# Patient Record
Sex: FEMALE | Race: BLACK OR AFRICAN AMERICAN | Employment: UNEMPLOYED | URBAN - METROPOLITAN AREA
[De-identification: names, ages, dates, MRNs, and addresses within clinical notes are randomized per-mention and may not be internally consistent; named-entity substitution may affect disease eponyms.]

---

## 2023-05-27 ENCOUNTER — HOSPITAL ENCOUNTER (INPATIENT)
Facility: HOSPITAL | Age: 28
LOS: 6 days | Discharge: HOME | End: 2023-06-03
Attending: EMERGENCY MEDICINE | Admitting: PSYCHIATRY & NEUROLOGY
Payer: MEDICAID

## 2023-05-27 DIAGNOSIS — F23 ACUTE PSYCHOSIS (HCC): Primary | ICD-10-CM

## 2023-05-27 PROCEDURE — 80307 DRUG TEST PRSMV CHEM ANLYZR: CPT

## 2023-05-27 PROCEDURE — 81001 URINALYSIS AUTO W/SCOPE: CPT

## 2023-05-27 PROCEDURE — 85025 COMPLETE CBC W/AUTO DIFF WBC: CPT

## 2023-05-27 PROCEDURE — 87636 SARSCOV2 & INF A&B AMP PRB: CPT

## 2023-05-27 PROCEDURE — 80048 BASIC METABOLIC PNL TOTAL CA: CPT

## 2023-05-27 PROCEDURE — 99285 EMERGENCY DEPT VISIT HI MDM: CPT

## 2023-05-27 PROCEDURE — 82077 ASSAY SPEC XCP UR&BREATH IA: CPT

## 2023-05-27 PROCEDURE — 84703 CHORIONIC GONADOTROPIN ASSAY: CPT

## 2023-05-27 PROCEDURE — 36415 COLL VENOUS BLD VENIPUNCTURE: CPT

## 2023-05-28 PROBLEM — F33.9 MAJOR DEPRESSION, RECURRENT (HCC): Status: ACTIVE | Noted: 2023-05-28

## 2023-05-28 PROBLEM — F33.9 MAJOR DEPRESSIVE DISORDER, RECURRENT (HCC): Status: ACTIVE | Noted: 2023-05-28

## 2023-05-28 LAB
AMPHET UR QL SCN: NEGATIVE
ANION GAP SERPL CALC-SCNC: 8 MMOL/L (ref 5–15)
APPEARANCE UR: CLEAR
BACTERIA URNS QL MICRO: NEGATIVE /HPF
BARBITURATES UR QL SCN: NEGATIVE
BASOPHILS # BLD: 0.1 K/UL (ref 0–0.1)
BASOPHILS NFR BLD: 1 % (ref 0–1)
BENZODIAZ UR QL: NEGATIVE
BILIRUB UR QL: NEGATIVE
BUN SERPL-MCNC: 13 MG/DL (ref 6–20)
BUN/CREAT SERPL: 15 (ref 12–20)
CA-I BLD-MCNC: 9.3 MG/DL (ref 8.5–10.1)
CANNABINOIDS UR QL SCN: NEGATIVE
CHLORIDE SERPL-SCNC: 104 MMOL/L (ref 97–108)
CO2 SERPL-SCNC: 20 MMOL/L (ref 21–32)
COCAINE UR QL SCN: NEGATIVE
COLOR UR: ABNORMAL
CREAT SERPL-MCNC: 0.87 MG/DL (ref 0.55–1.02)
DIFFERENTIAL METHOD BLD: ABNORMAL
EOSINOPHIL # BLD: 0 K/UL (ref 0–0.4)
EOSINOPHIL NFR BLD: 0 % (ref 0–7)
EPITH CASTS URNS QL MICRO: ABNORMAL /LPF
ERYTHROCYTE [DISTWIDTH] IN BLOOD BY AUTOMATED COUNT: 13.1 % (ref 11.5–14.5)
ETHANOL SERPL-MCNC: 16 MG/DL (ref 0–0.08)
FLUAV RNA SPEC QL NAA+PROBE: NOT DETECTED
FLUBV RNA SPEC QL NAA+PROBE: NOT DETECTED
GLUCOSE SERPL-MCNC: 114 MG/DL (ref 65–100)
GLUCOSE UR STRIP.AUTO-MCNC: NEGATIVE MG/DL
HCG SERPL QL: NEGATIVE
HCT VFR BLD AUTO: 34.2 % (ref 35–47)
HGB BLD-MCNC: 11.2 G/DL (ref 11.5–16)
HGB UR QL STRIP: NEGATIVE
HYALINE CASTS URNS QL MICRO: ABNORMAL /LPF (ref 0–5)
IMM GRANULOCYTES # BLD AUTO: 0 K/UL (ref 0–0.04)
IMM GRANULOCYTES NFR BLD AUTO: 0 % (ref 0–0.5)
KETONES UR QL STRIP.AUTO: 20 MG/DL
LEUKOCYTE ESTERASE UR QL STRIP.AUTO: NEGATIVE
LYMPHOCYTES # BLD: 1.8 K/UL (ref 0.8–3.5)
LYMPHOCYTES NFR BLD: 29 % (ref 12–49)
Lab: ABNORMAL
MCH RBC QN AUTO: 26.8 PG (ref 26–34)
MCHC RBC AUTO-ENTMCNC: 32.7 G/DL (ref 30–36.5)
MCV RBC AUTO: 81.8 FL (ref 80–99)
METHADONE UR QL: NEGATIVE
MONOCYTES # BLD: 0.4 K/UL (ref 0–1)
MONOCYTES NFR BLD: 7 % (ref 5–13)
MUCOUS THREADS URNS QL MICRO: ABNORMAL /LPF
NEUTS SEG # BLD: 4 K/UL (ref 1.8–8)
NEUTS SEG NFR BLD: 63 % (ref 32–75)
NITRITE UR QL STRIP.AUTO: NEGATIVE
NRBC # BLD: 0 K/UL (ref 0–0.01)
NRBC BLD-RTO: 0 PER 100 WBC
OPIATES UR QL: POSITIVE
PCP UR QL: NEGATIVE
PH UR STRIP: 5 (ref 5–8)
PLATELET # BLD AUTO: 368 K/UL (ref 150–400)
PMV BLD AUTO: 10.3 FL (ref 8.9–12.9)
POTASSIUM SERPL-SCNC: 3.7 MMOL/L (ref 3.5–5.1)
PROT UR STRIP-MCNC: NEGATIVE MG/DL
RBC # BLD AUTO: 4.18 M/UL (ref 3.8–5.2)
RBC #/AREA URNS HPF: ABNORMAL /HPF (ref 0–5)
SARS-COV-2 RNA RESP QL NAA+PROBE: NOT DETECTED
SODIUM SERPL-SCNC: 132 MMOL/L (ref 136–145)
SP GR UR REFRACTOMETRY: 1.01 (ref 1–1.03)
URINE CULTURE IF INDICATED: ABNORMAL
UROBILINOGEN UR QL STRIP.AUTO: 0.1 EU/DL (ref 0.1–1)
WBC # BLD AUTO: 6.3 K/UL (ref 3.6–11)
WBC URNS QL MICRO: ABNORMAL /HPF (ref 0–4)

## 2023-05-28 PROCEDURE — 6360000002 HC RX W HCPCS

## 2023-05-28 PROCEDURE — 6370000000 HC RX 637 (ALT 250 FOR IP): Performed by: PSYCHIATRY & NEUROLOGY

## 2023-05-28 PROCEDURE — 1240000000 HC EMOTIONAL WELLNESS R&B

## 2023-05-28 PROCEDURE — 6360000002 HC RX W HCPCS: Performed by: PSYCHIATRY & NEUROLOGY

## 2023-05-28 PROCEDURE — 2580000003 HC RX 258: Performed by: EMERGENCY MEDICINE

## 2023-05-28 PROCEDURE — 6360000002 HC RX W HCPCS: Performed by: EMERGENCY MEDICINE

## 2023-05-28 RX ORDER — MAGNESIUM HYDROXIDE/ALUMINUM HYDROXICE/SIMETHICONE 120; 1200; 1200 MG/30ML; MG/30ML; MG/30ML
30 SUSPENSION ORAL EVERY 6 HOURS PRN
Status: DISCONTINUED | OUTPATIENT
Start: 2023-05-28 | End: 2023-06-03 | Stop reason: HOSPADM

## 2023-05-28 RX ORDER — TRAZODONE HYDROCHLORIDE 50 MG/1
50 TABLET ORAL NIGHTLY PRN
Status: DISCONTINUED | OUTPATIENT
Start: 2023-05-28 | End: 2023-06-03 | Stop reason: HOSPADM

## 2023-05-28 RX ORDER — ACETAMINOPHEN 325 MG/1
650 TABLET ORAL EVERY 4 HOURS PRN
Status: DISCONTINUED | OUTPATIENT
Start: 2023-05-28 | End: 2023-06-03 | Stop reason: HOSPADM

## 2023-05-28 RX ORDER — ZIPRASIDONE MESYLATE 20 MG/ML
INJECTION, POWDER, LYOPHILIZED, FOR SOLUTION INTRAMUSCULAR
Status: COMPLETED
Start: 2023-05-28 | End: 2023-05-28

## 2023-05-28 RX ORDER — ZIPRASIDONE MESYLATE 20 MG/ML
20 INJECTION, POWDER, LYOPHILIZED, FOR SOLUTION INTRAMUSCULAR ONCE
Status: DISCONTINUED | OUTPATIENT
Start: 2023-05-28 | End: 2023-06-03 | Stop reason: HOSPADM

## 2023-05-28 RX ORDER — LORAZEPAM 2 MG/ML
1 INJECTION INTRAMUSCULAR EVERY 4 HOURS PRN
Status: DISCONTINUED | OUTPATIENT
Start: 2023-05-28 | End: 2023-06-03 | Stop reason: HOSPADM

## 2023-05-28 RX ORDER — ZIPRASIDONE MESYLATE 20 MG/ML
20 INJECTION, POWDER, LYOPHILIZED, FOR SOLUTION INTRAMUSCULAR EVERY 12 HOURS PRN
Status: DISCONTINUED | OUTPATIENT
Start: 2023-05-28 | End: 2023-06-03 | Stop reason: HOSPADM

## 2023-05-28 RX ORDER — ZIPRASIDONE HYDROCHLORIDE 20 MG/1
20 CAPSULE ORAL EVERY 12 HOURS PRN
Status: DISCONTINUED | OUTPATIENT
Start: 2023-05-28 | End: 2023-06-03 | Stop reason: HOSPADM

## 2023-05-28 RX ORDER — LORAZEPAM 1 MG/1
1 TABLET ORAL EVERY 4 HOURS PRN
Status: DISCONTINUED | OUTPATIENT
Start: 2023-05-28 | End: 2023-06-03 | Stop reason: HOSPADM

## 2023-05-28 RX ORDER — RISPERIDONE 1 MG/1
1 TABLET ORAL 2 TIMES DAILY
Status: DISCONTINUED | OUTPATIENT
Start: 2023-05-28 | End: 2023-05-29

## 2023-05-28 RX ADMIN — TRAZODONE HYDROCHLORIDE 50 MG: 50 TABLET ORAL at 21:33

## 2023-05-28 RX ADMIN — RISPERIDONE 1 MG: 1 TABLET ORAL at 21:33

## 2023-05-28 RX ADMIN — LORAZEPAM 1 MG: 2 INJECTION INTRAMUSCULAR; INTRAVENOUS at 12:20

## 2023-05-28 RX ADMIN — ZIPRASIDONE MESYLATE 20 MG: 20 INJECTION, POWDER, LYOPHILIZED, FOR SOLUTION INTRAMUSCULAR at 12:22

## 2023-05-28 RX ADMIN — ZIPRASIDONE MESYLATE 20 MG: 20 INJECTION, POWDER, LYOPHILIZED, FOR SOLUTION INTRAMUSCULAR at 03:05

## 2023-05-29 PROCEDURE — 6370000000 HC RX 637 (ALT 250 FOR IP): Performed by: PSYCHIATRY & NEUROLOGY

## 2023-05-29 PROCEDURE — 1240000000 HC EMOTIONAL WELLNESS R&B

## 2023-05-29 RX ORDER — RISPERIDONE 2 MG/1
2 TABLET ORAL 2 TIMES DAILY
Status: DISCONTINUED | OUTPATIENT
Start: 2023-05-29 | End: 2023-06-03 | Stop reason: HOSPADM

## 2023-05-29 RX ADMIN — TRAZODONE HYDROCHLORIDE 50 MG: 50 TABLET ORAL at 20:56

## 2023-05-29 RX ADMIN — RISPERIDONE 1 MG: 1 TABLET ORAL at 08:25

## 2023-05-29 RX ADMIN — RISPERIDONE 2 MG: 2 TABLET ORAL at 20:46

## 2023-05-29 NOTE — GROUP NOTE
Group Therapy Note    Date: 5/29/2023    Group Start Time: 1525  Group End Time: 9894  Group Topic: Recreational    SSR 2 BH NON ACUTE    Mahesh Blount        Group Therapy Note    Facilitated leisure skills group to reinforce positive coping and to manage mood through music, social interaction, group activities and art task    Attendees: 7/10      Notes:  Encouraged but did not attend    Discipline Responsible: Recreational Therapist      Signature:  Mahesh Blount, [address]

## 2023-05-29 NOTE — BH NOTE
PSA PART II ADDITIONAL INFORMATION        Access To Fire Arms: No    Substance Use: No    Last Use: Pt denied    Type of Substance: no substance use    Frequency of Use: n/a    Request to See : No    If yes, notified : No    Guardian:No    Guardian Contact:Pt is her own legal guardian    Release of Information Signed: Yes    Release of Information Signed For: Ervin Macias (sister) [phone],ADRYAN (boy friend) [phone]

## 2023-05-29 NOTE — CONSULTS
Consult Hospitalist History and Physical    Patient: Gina Warren MRN: [id number]  SSN: [id number]    YOB: 1995  Age: 25 y.o. Sex: female      Subjective:      Gina Warren is a 25 y.o. female with no past medical history that presented to the emergency room on 2023 under E CO for suicidal ideation and acute psychosis. Patient medically cleared from the emergency room and transferred to behavioral health under Dr. Cb patel. Team was consulted on for full history and physical.  Patient overall poor historian and slow to respond however does answer questions appropriately. She denies any past medical history or taking any medications on a routine basis. She does admit to 2  sections. Denies any allergies to any medications. Currently she denies any chest pain, shortness breath, abdominal pain, nausea, vomiting, diarrhea    Past Medical History:   Diagnosis Date    Bipolar 1 disorder (Shasta Jeffrey)      No past surgical history on file.  -2  sections  No family history on file.  -Hypertension  Social History     Tobacco Use    Smoking status: Not on file    Smokeless tobacco: Not on file   Substance Use Topics    Alcohol use: Not on file      Prior to Admission medications    Not on File        Not on File    Review of Systems:  Constitutional: No fevers, No chills, No fatigue, No weakness  Eyes: No visual disturbance  Ears, Nose, Mouth, Throat, and Face: No nasal congestion, No sore throat  Respiratory: No cough, No sputum, No wheezing, No SOB  Cardiovascular: No chest pain, No lower extremity edema, No Palpitations   Gastrointestinal: No nausea, No vomiting, No diarrhea, No constipation, No abdominal pain  Genitourinary: No frequency, No dysuria, No hematuria  Integument/Breast: No rash, No skin lesion(s), No dryness  Musculoskeletal: No arthralgias, No neck pain, No back pain  Neurological: No headaches, No dizziness, No confusion,  No seizures  Behavioral/Psychiatric: No

## 2023-05-29 NOTE — GROUP NOTE
Group Therapy Note    Date: 5/29/2023    Group Start Time: 8739  Group End Time: 1200  Group Topic: Education Group - Inpatient    [facility] [address] [facility] NON ACUTE    Jono Shell        Group Therapy Note    Facilitated group to define different types of cognitive distortions and give examples to help recognize how they have engaged in some of them and how they affect moods and behaviors    Attendees: 4/10       Notes:  Encouraged but did not attend     Discipline Responsible: Recreational Therapist      Signature:  Jono Shell., CTRS

## 2023-05-29 NOTE — BH NOTE
PSYCHOSOCIAL ASSESSMENT  :Patient identifying info:   Devyn Hanley is a 25 y.o., female admitted 5/27/2023 10:28 PM     Presenting problem and precipitating factors: Pt was brought the to the ED after her sister placed her under an ECO after stating that she wanted to kill herself. Pt presented with flight of thought that had loose associations. Pts sister shared that pt started acting \"bizarre\" and \"saying things out of her head\". Pts sister shared that she stated that she was \"HIV positive\" and does not know if that is a fact. She shared that pt had a \"mental breakdown in March and was in the hospital for 3 weeks\". She is not sure if the pt is taking her medication prescribed because she found a full bottle in her belongings. There was also a dx of unspecified bi-polar from her last hospitalization. Mental status assessment: Pt presented with a tired, disconnected affect and congruent mood. Pt denied any SI/HI/Avh at the time and was orientated x2. Pt provided minimal eye contact and verbal communication with the writer. Pt shared that she was trying to \"get my head right\". Pt had a difficult time sharing why, or how she was in the hospital. Pt appeared to have slight impairment in her memory and cognition.      Strengths/Recreation/Coping Skills:Pt denied to respond    Collateral information: Lizbet Patel (sister) [phone] Ebenezer Mahmood (boy friend) [phone]    Current psychiatric /substance abuse providers and contact info: Pt has a therapist and psychiatrist in [place]    Previous psychiatric/substance abuse providers and response to treatment: Pt was hospitalized multiple times for mental health with the last one being in March for 3 weeks    Family history of mental illness or substance abuse: Pts mother and brother are dx with mental health (per sister) anxiety and depression    Substance abuse history:    Social History     Tobacco Use    Smoking status: Not on file    Smokeless tobacco: Not on file

## 2023-05-29 NOTE — PLAN OF CARE
Problem: Confusion  Goal: Confusion, delirium, dementia, or psychosis is improved or at baseline  Description: INTERVENTIONS:  1. Assess for possible contributors to thought disturbance, including medications, impaired vision or hearing, underlying metabolic abnormalities, dehydration, psychiatric diagnoses, and notify attending LIP  2. [place] high risk fall precautions, as indicated  3. Provide frequent short contacts to provide reality reorientation, refocusing and direction  4. Decrease environmental stimuli, including noise as appropriate  5. Monitor and intervene to maintain adequate nutrition, hydration, elimination, sleep and activity  6. If unable to ensure safety without constant attention obtain sitter and review sitter guidelines with assigned personnel  7.  Initiate Psychosocial CNS and Spiritual Care consult, as indicated  Outcome: Not Progressing  Flowsheets  Taken 5/29/2023 0425  Effect of thought disturbance (confusion, delirium, dementia, or psychosis) are managed with adequate functional status:   Assess for contributors to thought disturbance, including medications, impaired vision or hearing, underlying metabolic abnormalities, dehydration, psychiatric diagnoses, notify LIP   Decrease environmental stimuli, including noise as appropriate   Monitor and intervene to maintain adequate nutrition, hydration, elimination, sleep and activity   If unable to ensure safety without constant attention obtain sitter and review sitter guidelines with assigned personnel  Taken 5/28/2023 2130  Effect of thought disturbance (confusion, delirium, dementia, or psychosis) are managed with adequate functional status:   Assess for contributors to thought disturbance, including medications, impaired vision or hearing, underlying metabolic abnormalities, dehydration, psychiatric diagnoses, notify LIP   Decrease environmental stimuli, including noise as appropriate   Monitor and intervene to maintain adequate

## 2023-05-29 NOTE — BH NOTE
1:1 Notes:     0829PP- Patient laying in the bed resting with 1:1 staff in room. 26am- Patient laying in the bed awake. Patient has a flat affect and a delayed response. Patient stated that she was \"just here\" in response to how she was doing today. Patient medication compliant but did ask what the medication was for. Denies SI/HI. Patient states \"sometimes\" to hearing things and or seeing things. 1:1 staff at bedside and reported that she ate a little of her breakfast.     1030am- Patient in bed awake. Patient continues to state that Ailyn Weeks is confused and everything is like a puzzle to her right now. \" Patient presents with a flat affect and constricted and delayed response. Patient asked about her glasses and asked if she came in with glasses on. Patient noted to stand up and eat a couple bites of her breakfast and went back to her bed to sit on her bed. This writer sitting at bedside. 1249pm- Patient got up for lunch and went back to her room with her tray but did not eat her lunch. Patient sitting in the dayroom for a little while and went back to her room. Patient presents with a flat affect. 1:1 staff present with patient. 1505pm- Patient laying in the bed with 1:1 staff at bedside. 1650pm- Patient sitting in her bed eating her dinner. Patient shook her head yes to doing okay. 1:1 staff at bedside. 1810pm- Patient laying in the bed with her eyes closed and quiet. 1:1 staff at bedside.

## 2023-05-29 NOTE — BH NOTE
Patient observed up on unit in day room watching television. No interaction with peers. Remains on 1:1 observation with staff at her side. She requested her sister's phone number. She was unsure what brought her to our ED and the events that have transpired since arriving to the floor. She was familiar with the TDO process. Patient is suspicious and guarded with poor eye contact. During evening medication pass, patient questioned what doctor had ordered them. Told her Dr. Oscar Osborn then patient replied \"oh you mean the devil. \"  Poor insight with pressured speech. Remains paranoid, delusional with psychotic features. Accepted evening medication with some encouragement. Patient awoke twice during the night. Presented restless and fidgety but declined prn medications. Returned to bed shortly after getting up. Currently, patient is lying in bed with eyes closed, respirations even and unlabored. No signs of pain or distress. Remains on 1:1 close observation for safety. Staff member at bedside.

## 2023-05-30 PROCEDURE — 1240000000 HC EMOTIONAL WELLNESS R&B

## 2023-05-30 PROCEDURE — 6370000000 HC RX 637 (ALT 250 FOR IP): Performed by: PSYCHIATRY & NEUROLOGY

## 2023-05-30 RX ORDER — SERTRALINE HYDROCHLORIDE 25 MG/1
25 TABLET, FILM COATED ORAL DAILY
Status: DISCONTINUED | OUTPATIENT
Start: 2023-05-31 | End: 2023-05-31

## 2023-05-30 RX ADMIN — MAGNESIUM HYDROXIDE 30 ML: 400 SUSPENSION ORAL at 21:31

## 2023-05-30 RX ADMIN — RISPERIDONE 2 MG: 2 TABLET ORAL at 21:31

## 2023-05-30 RX ADMIN — RISPERIDONE 2 MG: 2 TABLET ORAL at 08:34

## 2023-05-30 RX ADMIN — TRAZODONE HYDROCHLORIDE 50 MG: 50 TABLET ORAL at 21:31

## 2023-05-30 NOTE — GROUP NOTE
Group Therapy Note    Date: 5/30/2023    Group Start Time: 8010  Group End Time: 1150  Group Topic: Education Group - Inpatient    SSR 2 BH NON ACUTE    Ananth Hernandez        Group Therapy Note    Facilitated group focused on introducing information on learning to complete a thought log to help challenge negative thoughts and develop a more accurate view of a situation to improve mood and behavior    Attendees: 4/10       Notes:  Encouraged but did not attend    Discipline Responsible: Recreational Therapist      Signature:  Ananth Hernandez, [address]

## 2023-05-30 NOTE — BH NOTE
0800am- Patient sitting in the dayroom this morning eating breakfast.  Patient was given fresh ice water. 1:1 staff present. 0835am- Patient laying in the bed awake. Patient is pleasant on approach. Patient denies SI/HI. Patient stated that she was unsure if she had any AH or VH and stated, \"I wouldn't be able to tell either way. \" Patient has an overall flat affect and is quiet and remains to herself. Patient is medication compliant. 1:1 staff at bedside. 1030am- Patient in the unit office talking with Dr. Dane Richard. 1230pm- Patient eating some of her lunch. 1:1 staff present. 1245pm- Patient had her TDO hearing and is committed up to 7 days. Patient cooperative during the hearing. 1255: Orders received from Dr. Governor Wood to discontinue patient 1:1 and transfer to the front unit. 1303- Patient moving to the front unit to room 245. ICU

## 2023-05-30 NOTE — BH NOTE
Behavioral Health Treatment Team Note     Patient goal(s) for today: To go to groups  Treatment team focus/goals: Medication management, group therapy, discharge planning    Progress note: Pt presents calm and cooperative with flat affect. She denies SI/HI/AVH and reports feeling \"better but still depressed\". Pt inquired about discharge criteria. Pt is receptive to medication management as she feels they weren't right before. Pt lives with her Dileep Marie [phone]. She plans to take a bus back home to [place] when discharged. Pt has a therapist named Ana Alves and a psychiatrist named Sophie Britt. She stated, \"I have protocol standards in place and preventative services that help me. \" Pt came to [place] for a family graduation. Pt said, \"I hope I didn't smoke my sister's marijuana. \"  She tested positive for Opiates. She's worried if she has AIDS and wondered if she could be tested because her family said she was screaming out loud that she is infected and she wants to know if it's her paranoia or if its true. An inpatient level of care is needed to further stabilize on medication.      LOS:  2  Expected LOS: TDO until 6/6/2023    Insurance info/prescription coverage:  Medicaid out of state  Date of last family contact:  Pt prefers collateral with Vasiliy Heck over her sister  Family requesting physician contact today:  No  Discharge plan:  Stabilize and resume services in [address] [place] [address] in the home:  No  Outpatient provider(s):  In [place]    Participating treatment team members: Danny Carty, * (assigned SW), GEETA Bonner

## 2023-05-30 NOTE — BH NOTE
Patient was moved to front unit after lunch. She has been isolative to her room, except to request ice water in her pitcher. She presents with a flat affect and calm behavior. No hypersexuality, no paranoia. Low energy. No physical complaints. Q15 minute checks maintained for safety.

## 2023-05-30 NOTE — PLAN OF CARE
Problem: Confusion  Goal: Confusion, delirium, dementia, or psychosis is improved or at baseline  Description: INTERVENTIONS:  1. Assess for possible contributors to thought disturbance, including medications, impaired vision or hearing, underlying metabolic abnormalities, dehydration, psychiatric diagnoses, and notify attending LIP  2. [place] high risk fall precautions, as indicated  3. Provide frequent short contacts to provide reality reorientation, refocusing and direction  4. Decrease environmental stimuli, including noise as appropriate  5. Monitor and intervene to maintain adequate nutrition, hydration, elimination, sleep and activity  6. If unable to ensure safety without constant attention obtain sitter and review sitter guidelines with assigned personnel  7. Initiate Psychosocial CNS and Spiritual Care consult, as indicated  5/29/2023 2147 by Corinne Floyd RN  Outcome: Progressing  5/29/2023 0905 by Diann Kumar RN  Outcome: Progressing     Problem: Psychosis  Goal: Will report no hallucinations or delusions  Description: INTERVENTIONS:  1. Administer medication as  ordered  2. Assist with reality testing to support increasing orientation  3. Assess if patient's hallucinations or delusions are encouraging self harm or harm to others and intervene as appropriate  5/29/2023 2147 by Corinne Floyd RN  Outcome: Progressing  5/29/2023 0905 by Diann Kumar RN  Outcome: Progressing     Problem: Behavior  Goal: Pt/Family maintain appropriate behavior and adhere to behavioral management agreement, if implemented  Description: INTERVENTIONS:  1. Assess patient/family's coping skills and  non-compliant behavior (including use of illegal substances)  2. Notify security of behavior or suspected illegal substances which indicate the need for search of the family and/or belongings  3. Encourage verbalization of thoughts and concerns in a socially appropriate manner  4.  Utilize positive,
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
## 2023-05-30 NOTE — BH NOTE
Nursing Note    Patient is alert and oriented X 2. She is able to answer assessment questions and appears confused. Patient requested medications to help her sleep. 1:1 sitter at bedside. Patient remained in bed for the majority of the shift. She took a shower. Patient voiced no complaints and accepted her medications without difficulty. Staff will  continue to monitor patient for safety.

## 2023-05-31 PROCEDURE — 6370000000 HC RX 637 (ALT 250 FOR IP): Performed by: PSYCHIATRY & NEUROLOGY

## 2023-05-31 PROCEDURE — 1240000000 HC EMOTIONAL WELLNESS R&B

## 2023-05-31 RX ORDER — DOCUSATE SODIUM 100 MG/1
100 CAPSULE, LIQUID FILLED ORAL DAILY
Status: DISCONTINUED | OUTPATIENT
Start: 2023-05-31 | End: 2023-06-03 | Stop reason: HOSPADM

## 2023-05-31 RX ADMIN — MAGNESIUM HYDROXIDE 30 ML: 400 SUSPENSION ORAL at 18:09

## 2023-05-31 RX ADMIN — TRAZODONE HYDROCHLORIDE 50 MG: 50 TABLET ORAL at 21:42

## 2023-05-31 RX ADMIN — RISPERIDONE 2 MG: 2 TABLET ORAL at 08:21

## 2023-05-31 RX ADMIN — SERTRALINE 25 MG: 25 TABLET, FILM COATED ORAL at 08:21

## 2023-05-31 RX ADMIN — DOCUSATE SODIUM 100 MG: 100 CAPSULE, LIQUID FILLED ORAL at 16:47

## 2023-05-31 RX ADMIN — RISPERIDONE 2 MG: 2 TABLET ORAL at 21:22

## 2023-05-31 NOTE — BH NOTE
Pt.is up and visible on unit,affect is flat,appetite good, appearance is appropriate,continues to display periods of thought blocking,states some things she doesn't remember prior to coming here, doesn't remember being exposed to opiates. Pt.declined repeat Covid testing but is asking to get tested for HIV. Denies hallucinations at present. isolates to self no concerns voiced,remains on close observation.

## 2023-05-31 NOTE — BH NOTE
The pt has rested quietly in bed throughout the night. She is med compliant and received prn Trazodone for sleep. Close observation maintained for safety with every 15 minute checks. Throughout the early part of the evening, the pt was up in the milieu and ambulating on the unit. She attended the evening activity group session, for a short time. She c/o feeling constipated and was given prn MOM and prune juice. The pt has an affect that is flat to [facility]. She has been cooperative and pleasant. When asked about having any anxiety or depression, the pt stated that she is \" feeling out of source. \" She has had A/V and tactile hallucinations. She stated that she was having a conversation with someone familiar and was touching a familiar face, but no actual person there. The pt has remained calm and appropriate. She accepted snack and something to drink. No c/o pain or discomfort. Safety rounds maintained for safety.
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
## 2023-05-31 NOTE — PLAN OF CARE
Problem: Risk for Elopement  Goal: Patient will not exit the unit/facility without proper excort  Outcome: Progressing     Problem: Confusion  Goal: Confusion, delirium, dementia, or psychosis is improved or at baseline  Description: INTERVENTIONS:  1. Assess for possible contributors to thought disturbance, including medications, impaired vision or hearing, underlying metabolic abnormalities, dehydration, psychiatric diagnoses, and notify attending LIP  2. [place] high risk fall precautions, as indicated  3. Provide frequent short contacts to provide reality reorientation, refocusing and direction  4. Decrease environmental stimuli, including noise as appropriate  5. Monitor and intervene to maintain adequate nutrition, hydration, elimination, sleep and activity  6. If unable to ensure safety without constant attention obtain sitter and review sitter guidelines with assigned personnel  7. Initiate Psychosocial CNS and Spiritual Care consult, as indicated  Outcome: Progressing     Problem: Self Harm/Suicidality  Goal: Will have no self-injury during hospital stay  Description: INTERVENTIONS:  1. Ensure constant observer at bedside with Q15M safety checks  2. Maintain a safe environment  3. Secure patient belongings  4. Ensure family/visitors adhere to safety recommendations  5. Ensure safety tray has been added to patient's diet order  6. Every shift and PRN: Re-assess suicidal risk via Frequent Screener    Outcome: Progressing     Problem: Linda  Goal: Will exhibit normal sleep and speech and no impulsivity  Description: INTERVENTIONS:  1. Administer medication as ordered  2. Set limits on impulsive behavior  3. Make attempts to decrease external stimuli as possible  Outcome: Progressing     Problem: Behavior  Goal: Pt/Family maintain appropriate behavior and adhere to behavioral management agreement, if implemented  Description: INTERVENTIONS:  1.  Assess patient/family's coping skills and  non-compliant

## 2023-05-31 NOTE — GROUP NOTE
Group Therapy Note    Date: 5/30/2023    Group Start Time: 2648  Group End Time: 1930  Group Topic: Recreational    SSR 2 Zayra Brito.        Group Therapy Note    Attendees: 5/7     Recreational Therapist facilitated structured leisure skills group to introduce healthy leisure skills as positive way to cope and manage mood. Patient's Goal:  Will report anxiety at manageable levels    Notes: Attended group. Listened to songs played in group. Was cooperative and receptive to intervention. Responded to staff with cues/prompts and encouragement to participate. Left group towards end of session and did not return. Status After Intervention:  Unchanged    Participation Level: Active Listener    Participation Quality: Attentive      Speech:  normal      Thought Process/Content: Logical      Affective Functioning: Congruent      Mood:  Calm       Level of consciousness:  Alert      Response to Learning: Progressing to goal      Endings: None Reported    Modes of Intervention: Activity      Discipline Responsible: Recreational Therapist      Signature:   EBER Huang Res
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
## 2023-06-01 PROCEDURE — 1240000000 HC EMOTIONAL WELLNESS R&B

## 2023-06-01 PROCEDURE — 6370000000 HC RX 637 (ALT 250 FOR IP): Performed by: PSYCHIATRY & NEUROLOGY

## 2023-06-01 RX ADMIN — RISPERIDONE 2 MG: 2 TABLET ORAL at 21:18

## 2023-06-01 RX ADMIN — RISPERIDONE 2 MG: 2 TABLET ORAL at 08:01

## 2023-06-01 RX ADMIN — DOCUSATE SODIUM 100 MG: 100 CAPSULE, LIQUID FILLED ORAL at 08:01

## 2023-06-01 RX ADMIN — SERTRALINE HYDROCHLORIDE 50 MG: 50 TABLET ORAL at 08:01

## 2023-06-01 RX ADMIN — TRAZODONE HYDROCHLORIDE 50 MG: 50 TABLET ORAL at 21:18

## 2023-06-01 NOTE — PROGRESS NOTES
Progress Note  Date:2023       Room:[facility]  Patient Georgiana Cantrell     YOB: 1995     Age:28 y.o. Subjective    Subjective   Patient has beenfeeling slightly better, still remains isolative and internally preoccupiede. Mental status examination-patient is alert oriented to name place person. Presents soft-spoken with restricted affect eyes negative. Denies currently having any auditory or visual hallucinations. Review of Systems  Objective         Vitals Last 24 Hours:  TEMPERATURE:  Temp  Av.6 °F (37 °C)  Min: 98.4 °F (36.9 °C)  Max: 98.7 °F (37.1 °C)  RESPIRATIONS RANGE: Resp  Av  Min: 18  Max: 18  PULSE OXIMETRY RANGE: No data recorded  PULSE RANGE: Pulse  Av  Min: 73  Max: 83  BLOOD PRESSURE RANGE: Systolic (56HPU), MPW:813 , Min:97 , ILS:495   ; Diastolic (47MGE), RZP:62, Min:62, Max:80    I/O (24Hr): No intake or output data in the 24 hours ending 23 314  Objective:  Vital signs: (most recent): Blood pressure 133/80, pulse 83, temperature 98.7 °F (37.1 °C), temperature source Oral, resp. rate 18, SpO2 97 %. Labs/Imaging/Diagnostics    Labs:  CBC:  No results for input(s): WBC, RBC, HGB, HCT, MCV, RDW, PLT in the last 72 hours. CHEMISTRIES:  No results for input(s): NA, K, CL, CO2, BUN, CREATININE, GLUCOSE, CA, PHOS, MG in the last 72 hours. PT/INR:No results for input(s): PROTIME, INR in the last 72 hours. APTT:No results for input(s): APTT in the last 72 hours. LIVER PROFILE:No results for input(s): AST, ALT, BILIDIR, BILITOT, ALKPHOS in the last 72 hours. Imaging Last 24 Hours:  No results found.   Assessment//Plan           Hospital Problems             Last Modified POA    * (Principal) Major depressive disorder, recurrent ([facility] 2023 Yes    Major depression, recurrent ([facility] 2023 Yes   Assessment & Plan  Tolerating Risperdal 2 mg p.o. twice daily  Increase Zoloft 25 mg p.o. daily tp 50 mg po daily  She states that she has had

## 2023-06-01 NOTE — BH NOTE
Pt up on unit sitting in dayroom interacting with peers and staff. Pt presented with a flat affect, denies everything, compliant with medication, no complaint of any pain or discomfort, pt remain on close observation for safety.

## 2023-06-01 NOTE — BH NOTE
Behavioral Health Treatment Team Note     Patient goal(s) for today: To go to groups  Treatment team focus/goals: Medication management, group therapy, discharge planning    Progress note: Pt presents with psychomotor retardation but is clear in thought. Her affect is blunted. Pt denies SI/HI/AVH and reports feeling able to explain what she was experiencing better now. Pt states when she was admitted she felt like she was in \"[place]\". She denies feeling that way now. Pt states this hospital is more thorough with assessments than in [place]. She states she hasn't felt this comfortable explaining what she's going through. Pt is interested in trauma work stating, \"It seems like I do all the talking [in [place]] and never learn how to get over it. I never know what to focus on because there's just so much. \" Pt has 5 children ages 15, 6, 5, 10, and 3. She reports it to be \"good when I'm on medication. \" Writer left a  for oNrth to obtain a list of medications the pt takes at home. An inpatient level of care is needed to further stabilize pt on medication. Collateral with pt's sister: She will bring pt's belongings to the nurses station at noon Friday.      LOS:  4  Expected LOS: TDO 6/6    Insurance info/prescription coverage:  Medicaid out of state  Date of last family contact:  6/1 left  for [facility]  Family requesting physician contact today:  No  Discharge plan:  Stabilize and resume services in [address] [place] [address] in the home:  No  Outpatient provider(s):  In [place]     Participating treatment team members: Roro Bryant, * (assigned SW), GEETA Reyna

## 2023-06-01 NOTE — GROUP NOTE
Group Therapy Note    Date: 6/1/2023    Group Start Time: 8966  Group End Time: 1030  Group Topic: Education Group - Inpatient    SSR 2 BH NON ACUTE    José Trevino        Group Therapy Note    Facilitated discussion focus on identifying different barriers that has prevented progress and identifying ways to confront them    Attendees: 6/8       Patient's Goal:  Attend group daily     Notes:  Receptive to information discussed on different barriers and was able to share \"pleasing others, unresolved grief and stubbornness\" as her biggest barriers and shared they prevent her from \"putting herself and health first\"Pt shared she can confront these barriers by \"asking for help\"    Status After Intervention:  Improved    Participation Level:  Active Listener and Interactive    Participation Quality: Appropriate, Attentive, and Sharing      Speech:  normal      Thought Process/Content: Logical      Affective Functioning: Congruent      Mood:  Calm      Level of consciousness:  Attentive      Response to Learning: Progressing to goal      Endings: None Reported    Modes of Intervention: Education and Support      Discipline Responsible: Recreational Therapist      Signature:  José Trevino, [address]

## 2023-06-01 NOTE — GROUP NOTE
Group Therapy Note    Date: 6/1/2023    Group Start Time: 8638  Group End Time: 9821  Group Topic: Recreational    SSR 2 BH NON ACUTE    La Preston        Group Therapy Note    Facilitated leisure skills group to reinforce positive coping and to manage mood through music, social interaction, group activities and art task    Attendees: 6/8       Patient's Goal:  \"Attend group daily \"    Notes:  Receptive to listening to music while working on leisure task. Interacted with peer and staff when prompted    Status After Intervention:  Improved    Participation Level:  Active Listener    Participation Quality: Attentive      Speech:  normal      Thought Process/Content: Logical      Affective Functioning: Congruent      Mood:  Calm      Level of consciousness:  Attentive      Response to Learning: Progressing to goal      Endings: None Reported    Modes of Intervention: Socialization and Activity      Discipline Responsible: Recreational Therapist      Signature:  La Preston, [address]

## 2023-06-01 NOTE — BH NOTE
Pt is alert and oriented to person and situation. Affect and mood are flat and sad. Rates depression 5/10 and anxiety 8/10. Denies SI/HI/AVH at this time. Responses continue to be delayed at times. Pt reports working on getting her therapy appointments situated at home, states she does worry about her children. Joe Martinez, therapist assured pt that her children are safe with her [facility]. Medication and meal compliant. Attending groups and tending to personal hygiene.

## 2023-06-02 PROCEDURE — 1240000000 HC EMOTIONAL WELLNESS R&B

## 2023-06-02 PROCEDURE — 6370000000 HC RX 637 (ALT 250 FOR IP): Performed by: PSYCHIATRY & NEUROLOGY

## 2023-06-02 RX ORDER — TRAZODONE HYDROCHLORIDE 50 MG/1
50 TABLET ORAL NIGHTLY PRN
Qty: 30 TABLET | Refills: 1 | Status: SHIPPED | OUTPATIENT
Start: 2023-06-02

## 2023-06-02 RX ORDER — RISPERIDONE 2 MG/1
2 TABLET ORAL 2 TIMES DAILY
Qty: 60 TABLET | Refills: 1 | Status: SHIPPED | OUTPATIENT
Start: 2023-06-02

## 2023-06-02 RX ORDER — FERROUS SULFATE 325(65) MG
325 TABLET ORAL 2 TIMES DAILY WITH MEALS
Status: DISCONTINUED | OUTPATIENT
Start: 2023-06-02 | End: 2023-06-03 | Stop reason: HOSPADM

## 2023-06-02 RX ORDER — FERROUS SULFATE 325(65) MG
325 TABLET ORAL 2 TIMES DAILY WITH MEALS
Status: DISCONTINUED | OUTPATIENT
Start: 2023-06-02 | End: 2023-06-02

## 2023-06-02 RX ADMIN — SERTRALINE HYDROCHLORIDE 50 MG: 50 TABLET ORAL at 08:14

## 2023-06-02 RX ADMIN — FERROUS SULFATE TAB 325 MG (65 MG ELEMENTAL FE) 325 MG: 325 (65 FE) TAB at 16:36

## 2023-06-02 RX ADMIN — TRAZODONE HYDROCHLORIDE 50 MG: 50 TABLET ORAL at 21:03

## 2023-06-02 RX ADMIN — RISPERIDONE 2 MG: 2 TABLET ORAL at 08:14

## 2023-06-02 RX ADMIN — RISPERIDONE 2 MG: 2 TABLET ORAL at 21:03

## 2023-06-02 RX ADMIN — DOCUSATE SODIUM 100 MG: 100 CAPSULE, LIQUID FILLED ORAL at 08:14

## 2023-06-02 NOTE — BH NOTE
Behavioral Health Transition Record to Provider    Patient Name: Katheen Simmonds  YOB: 1995  Medical Record Number: [id number]  Date of Admission: 5/27/2023  Date of Discharge: 6/3/2023    Attending Provider: Jayy Jay MD  Discharging Provider: Dr. Governor Wood  To contact this individual call [phone] and ask the  to page. If unavailable, ask to be transferred to [facility] Provider on call. [facility] Provider will be available on call 24/7 and during holidays. Primary Care Provider: None None    Not on File    Reason for Admission: Major depression, recurrent ([facility] Jaime.Harrison. 9]Major depressive disorder, recurrent ([facility] [F33.9]  Acute psychosis ([facility] [F23]    Admission Diagnosis: Major depression, recurrent ([address] [F33.9]  Major depressive disorder, recurrent ([facility] [F33.9]  Acute psychosis ([facility] [F23]    * No surgery found *    Results for orders placed or performed during the hospital encounter of 05/27/23   COVID-19 & Influenza Combo    Specimen: Nasopharyngeal   Result Value Ref Range    SARS-CoV-2, PCR Not Detected Not Detected      Rapid Influenza A By PCR Not Detected Not Detected      Rapid Influenza B By PCR Not Detected Not Detected     Basic Metabolic Panel   Result Value Ref Range    Sodium 132 (L) 136 - 145 mmol/L    Potassium 3.7 3.5 - 5.1 mmol/L    Chloride 104 97 - 108 mmol/L    CO2 20 (L) 21 - 32 mmol/L    Anion Gap 8 5 - 15 mmol/L    Glucose 114 (H) 65 - 100 mg/dL    BUN 13 6 - 20 mg/dL    Creatinine 0.87 0.55 - 1.02 mg/dL    Bun/Cre Ratio 15 12 - 20      Est, Glom Filt Rate >60 >60 ml/min/1.73m2    Calcium 9.3 8.5 - 10.1 mg/dL   Urinalysis with Reflex to Culture    Specimen: Urine   Result Value Ref Range    Color, UA Yellow/Straw      Appearance Clear Clear      Specific Gravity, UA 1.009 1.003 - 1.030      pH, Urine 5.0 5.0 - 8.0      Protein, UA Negative Negative mg/dL    Glucose, UA Negative Negative mg/dL    Ketones, Urine 20 (A) Negative mg/dL    Bilirubin

## 2023-06-02 NOTE — BH NOTE
DISCHARGE SUMMARY    Gurmeet Luciano  : 1995  MRN: [id number]    The patient Gustavo Yarbrough exhibits the ability to control behavior in a less restrictive environment. Patient's level of functioning is improving. No assaultive/destructive behavior has been observed for the past 24 hours. No suicidal/homicidal threat or behavior has been observed for the past 24 hours. There is no evidence of serious medication side effects. Patient has not been in physical or protective restraints for at least the past 24 hours. If weapons involved, how are they secured? N/a    Is patient aware of and in agreement with discharge plan? yes    Arrangements for medication:  Prescriptions printed    Copy of discharge instructions to provider?:  n/a    Arrangements for transportation home:  Bus ticket home to [address] all follow up appointments as scheduled, continue to take prescribed medications per physician instructions.   Mental health crisis number:  [facility] [facility] WARM LINE      [phone]      M-F: 9am to 9pm      Sat & Sun: 5pm - 9pm  National suicide prevention lines:                             2-161-DTVPANS ([phone])       9-612-163-TALK ([phone])    Crisis Text Line:  Text HOME to [phone]

## 2023-06-02 NOTE — GROUP NOTE
Group Therapy Note    Date: 6/2/2023    Group Start Time: 1800  Group End Time: 1845  Group Topic: Relaxation    SSR 2 BH NON ACUTE    Sheron Salter        Group Therapy Note    Facilitated group to discuss different types of relaxation techniques and introduced a relaxation technique  as a positive way to manage anxiety and stress symptoms    Attendees: 6/7       Patient's Goal:  Will report anxiety at manageable levels    Notes:  Receptive to information discussed and was able to sit quietly and focus on guided imagery to practice breathing exercise. Verbalized feeling relaxed a little bit    Status After Intervention:  Improved    Participation Level:  Active Listener    Participation Quality: Attentive      Speech:  normal      Thought Process/Content: Logical      Affective Functioning: Congruent      Mood:  Calm      Level of consciousness:  Attentive      Response to Learning: Progressing to goal      Endings: None Reported    Modes of Intervention: Activity      Discipline Responsible: Recreational Therapist      Signature:  Sheron Salter, [address]

## 2023-06-02 NOTE — BH NOTE
Patient remains on close observation. Patient has been alert, oriented, cooperative and pleasant. Flat affect. Patient displayed some memory issues. Patient said she was [place]. \"  Patient said she was \"panicy. \"  She said this was in relation to her discharge possibilities. She said she wants to be legitimately ready to go when she is discharged. She said she wants to see her children. Patient denied SI or HI. Medication compliant. Continue to assess.

## 2023-06-02 NOTE — GROUP NOTE
Group Therapy Note    Date: 6/2/2023    Group Start Time: 1325  Group End Time: 0024  Group Topic: Recreational    SSR 2  NON ACUTE    Anh Santamaria        Group Therapy Note    Facilitated leisure skills group to reinforce positive coping and to manage mood through music, social interaction, group activities and art task    Attendees: 7/8       Patient's Goal: Will report anxiety at manageable levels    Notes:  Receptive to listening to music and songs she selected while working on leisure task. Interacted with staff     Status After Intervention:  Improved    Participation Level:  Active Listener and Interactive    Participation Quality: Attentive      Speech:  normal      Thought Process/Content: Logical      Affective Functioning: Congruent      Mood:  Calm      Level of consciousness:  Attentive      Response to Learning: Progressing to goal      Endings: None Reported    Modes of Intervention: Socialization and Activity      Discipline Responsible: Recreational Therapist      Signature:  Anh Santamaria, [address]

## 2023-06-02 NOTE — GROUP NOTE
Group Therapy Note    Date: 6/2/2023    Group Start Time: 0935  Group End Time: 8524  Group Topic: Education Group - Inpatient    SSR 2  NON ACUTE    Yanelis Mohan        Group Therapy Note    Facilitated group to introduce a strategy to communicate in a more effective way that will help to express wants and needs in a way that is respectful to oneself and others to help increase a positive outcome from the interaction . Provided handout relating to the strategy Dear Man, the meaning of the acronym and the description of each step with an example    Attendees: 6/8       Patient's Goal:  Will report anxiety at manageable levels    Notes:  Receptive to information discussed and engaged. Pt was able to recognize example and shared a personal example when the strategy was used in a similar way    Status After Intervention:  Improved    Participation Level:  Active Listener and Interactive    Participation Quality: Appropriate, Attentive, and Sharing      Speech:  normal      Thought Process/Content: Logical      Affective Functioning: Congruent      Mood:  Calm      Level of consciousness:  Attentive      Response to Learning: Able to verbalize current knowledge/experience and Progressing to goal      Endings: None Reported    Modes of Intervention: Education and Support      Discipline Responsible: Recreational Therapist      Signature:  Yaneils Mohan, [address]

## 2023-06-02 NOTE — BH NOTE
Pt.is up at intervals, affect is flat, appetite good, appearance in hospital gown,pt.is pleasant upon approach,pt.is excited about discharge in am. Came to nurses station for confirmation,about discharge in am,no other concerns voiced, remains on close observation,

## 2023-06-02 NOTE — PROGRESS NOTES
Progress Note  Date:2023       Room:[facility]  Patient Renea Hernández     YOB: 1995     Age:28 y.o. Subjective    Subjective   Patient has been tolerating meds, still remains quiet and guarded. Feels overwhelmed, thinking to get back with her appointments and worrying about her children  No si.hi  No psychosis noted  Mental status examination-patient is alert oriented to name place person. Presents soft-spoken with restricted affect eyes negative. Denies currently having any auditory or visual hallucinations. Review of Systems  Objective         Vitals Last 24 Hours:  TEMPERATURE:  Temp  Av.1 °F (36.7 °C)  Min: 97.9 °F (36.6 °C)  Max: 98.3 °F (36.8 °C)  RESPIRATIONS RANGE: Resp  Av  Min: 18  Max: 18  PULSE OXIMETRY RANGE: No data recorded  PULSE RANGE: Pulse  Av  Min: 63  Max: 81  BLOOD PRESSURE RANGE: Systolic (96UNI), JWQ:499 , Min:116 , IPU:542   ; Diastolic (57GRO), YPK:87, Min:79, Max:84    I/O (24Hr): No intake or output data in the 24 hours ending 23 0040  Objective:  Vital signs: (most recent): Blood pressure 116/79, pulse 63, temperature 98.3 °F (36.8 °C), temperature source Oral, resp. rate 18, SpO2 97 %. Labs/Imaging/Diagnostics    Labs:  CBC:  No results for input(s): WBC, RBC, HGB, HCT, MCV, RDW, PLT in the last 72 hours. CHEMISTRIES:  No results for input(s): NA, K, CL, CO2, BUN, CREATININE, GLUCOSE, CA, PHOS, MG in the last 72 hours. PT/INR:No results for input(s): PROTIME, INR in the last 72 hours. APTT:No results for input(s): APTT in the last 72 hours. LIVER PROFILE:No results for input(s): AST, ALT, BILIDIR, BILITOT, ALKPHOS in the last 72 hours. Imaging Last 24 Hours:  No results found. Assessment//Plan           Hospital Problems             Last Modified POA    * (Principal) Major depressive disorder, recurrent ([facility] 2023 Yes    Major depression, recurrent ([facility] 2023 Yes   Assessment & Plan  Tolerating Risperdal 2 mg p.o.

## 2023-06-03 VITALS
HEART RATE: 89 BPM | SYSTOLIC BLOOD PRESSURE: 97 MMHG | DIASTOLIC BLOOD PRESSURE: 70 MMHG | RESPIRATION RATE: 18 BRPM | TEMPERATURE: 97.8 F | OXYGEN SATURATION: 97 %

## 2023-06-03 PROCEDURE — 6370000000 HC RX 637 (ALT 250 FOR IP): Performed by: PSYCHIATRY & NEUROLOGY

## 2023-06-03 RX ADMIN — SERTRALINE HYDROCHLORIDE 50 MG: 50 TABLET ORAL at 08:18

## 2023-06-03 RX ADMIN — DOCUSATE SODIUM 100 MG: 100 CAPSULE, LIQUID FILLED ORAL at 08:18

## 2023-06-03 RX ADMIN — FERROUS SULFATE TAB 325 MG (65 MG ELEMENTAL FE) 325 MG: 325 (65 FE) TAB at 08:18

## 2023-06-03 RX ADMIN — RISPERIDONE 2 MG: 2 TABLET ORAL at 08:18

## 2023-06-03 NOTE — BH NOTE
DAYSHIFT/DISCHARGE NOTE:     Patient up this morning and in the dayroom talking on the phone. Patient ate her breakfast. Patient is pleasant on approach and happy to be discharging today and heading to [place]. Patient smiled with conversations and states that she is doing better and feeling better. Denies SI/HI. Discharge instructions reviewed and understood with the patient and patient verbalized understanding. Prescriptions given to the patient and reviewed and educated to take to pharmacy and have them filled. Valuables returned to the patient from the safe and the belongings closet. Patient discharged via cab this morning without any complaints voiced and was thankful for the help.

## 2023-06-03 NOTE — BH NOTE
Patient remains on close observation. Patient has been alert, oriented, cooperative and pleasant. Patient has been up on the unit. She was once watching TV and once on the phone. Patient has a slightly flat affect. She said she feels \"pretty good. \"  She denied any problems with her mood. She denied any SI or HI. Medication compliant. Patient's cell phone is being charged for her trip tomorrow.

## 2023-06-07 NOTE — DISCHARGE SUMMARY
20 (A)  Negative mg/dL Final    Bilirubin Urine 05/27/2023 Negative  Negative   Final    Blood, Urine 05/27/2023 Negative  Negative   Final    Urobilinogen, Urine 05/27/2023 0.1  0.1 - 1.0 EU/dL Final    Nitrite, Urine 05/27/2023 Negative  Negative   Final    Leukocyte Esterase, Urine 05/27/2023 Negative  Negative   Final    WBC, UA 05/27/2023 0-4  0 - 4 /hpf Final    RBC, UA 05/27/2023 0-5  0 - 5 /hpf Final    Epithelial Cells UA 05/27/2023 Few  Few /lpf Final    BACTERIA, URINE 05/27/2023 Negative  Negative /hpf Final    Urine Culture if Indicated 05/27/2023 Culture not indicated by UA result  Culture not indicated by UA result   Final    Mucus, UA 05/27/2023 Trace (A)  Negative /lpf Final    Hyaline Casts, UA 05/27/2023 2-5  0 - 5 /lpf Final    Amphetamine, Urine 05/27/2023 Negative  Negative   Final    Barbiturates, Urine 05/27/2023 Negative  Negative   Final    Benzodiazepines, Urine 05/27/2023 Negative  Negative   Final    Cocaine, Urine 05/27/2023 Negative  Negative   Final    Methadone, Urine 05/27/2023 Negative  Negative   Final    Opiates, Urine 05/27/2023 Positive (A)  Negative   Final    PCP, Urine 05/27/2023 Negative  Negative   Final    THC, TH-Cannabinol, Urine 05/27/2023 Negative  Negative   Final    Comments: 05/27/2023     Final                    Value: This test is a screen for drugs of abuse in a medical setting only (i.e., they are unconfirmed results and as such must not be used for non-medical purposes, e.g.,employment testing, legal testing). Due to its inherent nature, false positive (FP) and false negative (FN) results may be obtained. Therefore, if necessary for medical care, recommend confirmation of positive findings by GC/MS.       Ethanol Lvl 05/27/2023 16 (H)  <10 mg/dL Final    SARS-CoV-2, PCR 05/27/2023 Not Detected  Not Detected   Final    Rapid Influenza A By PCR 05/27/2023 Not Detected  Not Detected   Final    Rapid Influenza B By PCR 05/27/2023 Not Detected  Not Detected   Final